# Patient Record
Sex: FEMALE | Race: WHITE
[De-identification: names, ages, dates, MRNs, and addresses within clinical notes are randomized per-mention and may not be internally consistent; named-entity substitution may affect disease eponyms.]

---

## 2019-03-29 ENCOUNTER — HOSPITAL ENCOUNTER (OUTPATIENT)
Dept: HOSPITAL 92 - BICMRI | Age: 34
Discharge: HOME | End: 2019-03-29
Attending: OTOLARYNGOLOGY
Payer: COMMERCIAL

## 2019-03-29 DIAGNOSIS — R09.81: ICD-10-CM

## 2019-03-29 DIAGNOSIS — J32.4: ICD-10-CM

## 2019-03-29 DIAGNOSIS — J33.9: Primary | ICD-10-CM

## 2019-03-29 PROCEDURE — 70543 MRI ORBT/FAC/NCK W/O &W/DYE: CPT

## 2019-03-29 NOTE — MRI
[facility] paranasal sinuses with and without contrast:

3/29/2019



HISTORY:

34-year-old female

J 33.9 nasal polyp, unspecified

R09.81 nasal congestion

J 32.4 chronic pansinusitis



"Left-sided sphenoid dehiscence. Fungal sinusitis. Most likely sphenoid sinusitis but need to rule ou
t cephalocele/mucocele/fungal sinusitis or connection with intracranial contents"



TECHNIQUE:

Multiplanar, multisequence MRI of paranasal sinuses pre and post IV injection of MultiHance gadoliniu
m-based contrast agent.



COMPARISON:

None



FINDINGS:

A single dominant sphenoid air cell sphenoid air cell is identified centered at midline. The lumen is
 totally opacified. This consists of peripheral moderately severe lobulated mucosal thickening that i
s T1 hypointense, T2 hyperintense and enhances. It is centrally filled with nonenhancing material damaris
t is T1 isointense relative to brain parenchyma, heterogeneously mixed T2 hypointense and slightly hy
perintense. This is consistent with central inspissated proteinaceous mucus. Mycetoma is not ruled ou
t. There is no obvious breach of the bony walls of the sphenoid sinus relative to the intracranial ca
vity. There is no encephalocele: No herniation of brain tissue into the sphenoid sinus, ethmoid air c
ells, or nasal cavity. There is severe mucosal thickening and proteinaceous material causing severe p
artial opacification of adjacent left posterior ethmoid air cells. Minimal mucosal thickening of left
 maxillary sinus. Signal voids throughout the right maxillary sinus, most of the right ethmoid air ce
lls, and bilateral frontal sinuses. No evidence of mastoid effusion.



Regarding the brain parenchyma, there is no restricted diffusion, mass effect, obstructive hydrocepha
isamar, or extra-axial fluid collection. Not all portions of the brain are completely imaged.



IMPRESSION:

1. Total opacification of midline single dominant sphenoid air cell, filled with inspissated proteina
ceous mucus. Mycetoma is not ruled in or ruled out.

2. No encephalocele.



Reported By: Alistair La 

Electronically Signed:  3/29/2019 10:55 AM